# Patient Record
Sex: MALE | Race: WHITE
[De-identification: names, ages, dates, MRNs, and addresses within clinical notes are randomized per-mention and may not be internally consistent; named-entity substitution may affect disease eponyms.]

---

## 2020-10-16 ENCOUNTER — HOSPITAL ENCOUNTER (OUTPATIENT)
Dept: HOSPITAL 53 - M LABSMTC | Age: 55
End: 2020-10-16
Attending: ANESTHESIOLOGY
Payer: COMMERCIAL

## 2020-10-16 DIAGNOSIS — Z01.812: Primary | ICD-10-CM

## 2020-10-16 DIAGNOSIS — Z20.828: ICD-10-CM

## 2020-10-21 ENCOUNTER — HOSPITAL ENCOUNTER (OUTPATIENT)
Dept: HOSPITAL 53 - M OPP | Age: 55
Discharge: HOME | End: 2020-10-21
Attending: INTERNAL MEDICINE
Payer: COMMERCIAL

## 2020-10-21 VITALS — HEIGHT: 67 IN | BODY MASS INDEX: 28.09 KG/M2 | WEIGHT: 179 LBS

## 2020-10-21 VITALS — SYSTOLIC BLOOD PRESSURE: 126 MMHG | DIASTOLIC BLOOD PRESSURE: 87 MMHG

## 2020-10-21 DIAGNOSIS — K63.5: ICD-10-CM

## 2020-10-21 DIAGNOSIS — R19.8: ICD-10-CM

## 2020-10-21 DIAGNOSIS — Z15.09: Primary | ICD-10-CM

## 2020-10-21 DIAGNOSIS — K64.1: ICD-10-CM

## 2020-10-21 NOTE — ROOR
________________________________________________________________________________

Patient Name: Colin Garcia             Procedure Date: 10/21/2020 11:39 AM

MRN: [id number]                          Account Number: [id number]

YOB: 1965               Age: 55

Room: [facility]                            Gender: Male

Note Status: Finalized                 

________________________________________________________________________________

 

Procedure:           Total Colonoscopy to Cecum + Cold Snare Polypectomy + 

                     Hemoclip

Indications:         Positive Cologuard test

Providers:           Oliver Kim MD

Referring MD:        YAZMIN CHANG MD

Requesting Provider: 

Medicines:           Monitored Anesthesia Care

Complications:       No immediate complications.

________________________________________________________________________________

Procedure:           Pre-Anesthesia Assessment:

                     - The heart rate, respiratory rate, oxygen saturations, 

                     blood pressure, adequacy of pulmonary ventilation, and 

                     response to care were monitored throughout the procedure.

                     The Colonoscope was introduced through the anus and 

                     advanced to the cecum, identified by appendiceal orifice 

                     and ileocecal valve. The colonoscopy was performed 

                     without difficulty. The patient tolerated the procedure 

                     well. The quality of the bowel preparation was excellent.

                                                                                

Findings:

     The perianal and digital rectal examinations were normal.

     Non-bleeding external internal hemorrhoids were found during 

     retroflexion. The hemorrhoids were small and Grade II (internal 

     hemorrhoids that prolapse but reduce spontaneously).

     A medium polyp was found at 40 cm proximal to the anus. The polyp was 

     sessile. The polyp was removed with a cold snare. Resection and retrieval 

     were complete. To prevent bleeding after the polypectomy, one hemostatic 

     clip was successfully placed (MR conditional). There was no bleeding at 

     the end of the procedure.

     The exam was otherwise normal throughout the examined colon.

                                                                                

Impression:          - Non-bleeding external internal hemorrhoids.

                     - One medium polyp at 40 cm proximal to the anus, removed 

                     with a cold snare. Resected and retrieved. Clip (MR 

                     conditional) was placed.

                     - The exam was otherwise normal to the cecum.

Recommendation:      - Patient has a contact number available for emergencies. 

                     The signs and symptoms of potential delayed complications 

                     were discussed with the patient. Return to normal 

                     activities tomorrow. Written discharge instructions were 

                     provided to the patient.

                     - Discharge patient to home.

                     - Continue present medications.

                     - Await pathology results.

                     - Telephone GI clinic for pathology results in 1 week.

                     - Repeat colonoscopy in 5 years for surveillance based on 

                     pathology results.

                     - Return to referring physician.

                     - The findings and recommendations were discussed with 

                     the patient.

                                                                                

 

Oliver Kim MD

____________________

Oliver Kim MD

10/21/2020 12:04:53 PM

Electronically signed by Oliver Kim MD

Number of Addenda: 0

 

Note Initiated On: 10/21/2020 11:39 AM

Estimated Blood Loss:

     Estimated blood loss: none.

## 2021-03-05 ENCOUNTER — HOSPITAL ENCOUNTER (OUTPATIENT)
Dept: HOSPITAL 53 - M WUC | Age: 56
End: 2021-03-05
Attending: PHYSICIAN ASSISTANT
Payer: COMMERCIAL

## 2021-03-05 DIAGNOSIS — M25.569: Primary | ICD-10-CM

## 2021-03-05 DIAGNOSIS — Z96.652: ICD-10-CM

## 2021-03-05 LAB
APTT BLD: 33.7 SECONDS (ref 24.2–38.5)
BASOPHILS # BLD AUTO: 0.1 10^3/UL (ref 0–0.2)
BASOPHILS NFR BLD AUTO: 0.7 % (ref 0–1)
EOSINOPHIL # BLD AUTO: 0.2 10^3/UL (ref 0–0.5)
EOSINOPHIL NFR BLD AUTO: 2 % (ref 0–3)
ERYTHROCYTE [SEDIMENTATION RATE] IN BLOOD BY WESTERGREN METHOD: 17 MM/HR (ref 0–20)
HCT VFR BLD AUTO: 45.9 % (ref 42–52)
HGB BLD-MCNC: 14.4 G/DL (ref 13.5–17.5)
INR PPP: 0.94
LYMPHOCYTES # BLD AUTO: 1.8 10^3/UL (ref 1.5–5)
LYMPHOCYTES NFR BLD AUTO: 15 % (ref 24–44)
MCH RBC QN AUTO: 27.6 PG (ref 27–33)
MCHC RBC AUTO-ENTMCNC: 31.4 G/DL (ref 32–36.5)
MCV RBC AUTO: 87.9 FL (ref 80–96)
MONOCYTES # BLD AUTO: 0.9 10^3/UL (ref 0–0.8)
MONOCYTES NFR BLD AUTO: 8 % (ref 2–8)
NEUTROPHILS # BLD AUTO: 8.6 10^3/UL (ref 1.5–8.5)
NEUTROPHILS NFR BLD AUTO: 73.6 % (ref 36–66)
PLATELET # BLD AUTO: 358 10^3/UL (ref 150–450)
PROTHROMBIN TIME: 12.8 SECONDS (ref 12.5–14.3)
RBC # BLD AUTO: 5.22 10^6/UL (ref 4.3–6.1)
WBC # BLD AUTO: 11.7 10^3/UL (ref 4–10)

## 2021-05-07 ENCOUNTER — HOSPITAL ENCOUNTER (OUTPATIENT)
Dept: HOSPITAL 53 - M WUC | Age: 56
End: 2021-05-07
Attending: PHYSICIAN ASSISTANT
Payer: COMMERCIAL

## 2021-05-07 DIAGNOSIS — Z96.652: Primary | ICD-10-CM

## 2021-05-07 LAB
ALBUMIN SERPL BCG-MCNC: 3.7 GM/DL (ref 3.2–5.2)
BASOPHILS # BLD AUTO: 0.1 10^3/UL (ref 0–0.2)
BASOPHILS NFR BLD AUTO: 0.7 % (ref 0–1)
CRP SERPL-MCNC: 1 MG/DL (ref 0–0.3)
EOSINOPHIL # BLD AUTO: 0.7 10^3/UL (ref 0–0.5)
EOSINOPHIL NFR BLD AUTO: 6.8 % (ref 0–3)
ERYTHROCYTE [SEDIMENTATION RATE] IN BLOOD BY WESTERGREN METHOD: 3 MM/HR (ref 0–20)
FERRITIN SERPL-MCNC: 35 NG/ML (ref 26–388)
HCT VFR BLD AUTO: 45.1 % (ref 42–52)
HGB BLD-MCNC: 14.4 G/DL (ref 13.5–17.5)
IRON SATN MFR SERPL: 16.8 % (ref 19.7–50)
IRON SERPL-MCNC: 52 UG/DL (ref 65–175)
LYMPHOCYTES # BLD AUTO: 2.6 10^3/UL (ref 1.5–5)
LYMPHOCYTES NFR BLD AUTO: 26.1 % (ref 24–44)
MCH RBC QN AUTO: 27.7 PG (ref 27–33)
MCHC RBC AUTO-ENTMCNC: 31.9 G/DL (ref 32–36.5)
MCV RBC AUTO: 86.7 FL (ref 80–96)
MONOCYTES # BLD AUTO: 0.8 10^3/UL (ref 0–0.8)
MONOCYTES NFR BLD AUTO: 7.7 % (ref 2–8)
NEUTROPHILS # BLD AUTO: 5.8 10^3/UL (ref 1.5–8.5)
NEUTROPHILS NFR BLD AUTO: 58.4 % (ref 36–66)
PLATELET # BLD AUTO: 302 10^3/UL (ref 150–450)
RBC # BLD AUTO: 5.2 10^6/UL (ref 4.3–6.1)
TIBC SERPL-MCNC: 309 UG/DL (ref 250–450)
WBC # BLD AUTO: 9.9 10^3/UL (ref 4–10)

## 2022-02-23 ENCOUNTER — HOSPITAL ENCOUNTER (OUTPATIENT)
Dept: HOSPITAL 53 - M RAD | Age: 57
End: 2022-02-23
Attending: INTERNAL MEDICINE

## 2022-02-23 DIAGNOSIS — M54.50: Primary | ICD-10-CM

## 2022-10-09 ENCOUNTER — HOSPITAL ENCOUNTER (OUTPATIENT)
Dept: HOSPITAL 53 - M LABSMTC | Age: 57
End: 2022-10-09
Attending: ANESTHESIOLOGY
Payer: COMMERCIAL

## 2022-10-09 DIAGNOSIS — Z20.822: ICD-10-CM

## 2022-10-09 DIAGNOSIS — Z01.812: Primary | ICD-10-CM

## 2022-10-12 ENCOUNTER — HOSPITAL ENCOUNTER (OUTPATIENT)
Dept: HOSPITAL 53 - M SDC | Age: 57
Discharge: HOME | End: 2022-10-12
Attending: ORTHOPAEDIC SURGERY
Payer: COMMERCIAL

## 2022-10-12 VITALS — DIASTOLIC BLOOD PRESSURE: 73 MMHG | SYSTOLIC BLOOD PRESSURE: 134 MMHG

## 2022-10-12 VITALS — HEIGHT: 67 IN | WEIGHT: 180.8 LBS | BODY MASS INDEX: 28.38 KG/M2

## 2022-10-12 DIAGNOSIS — Z79.899: ICD-10-CM

## 2022-10-12 DIAGNOSIS — R51.9: ICD-10-CM

## 2022-10-12 DIAGNOSIS — I10: ICD-10-CM

## 2022-10-12 DIAGNOSIS — F12.10: ICD-10-CM

## 2022-10-12 DIAGNOSIS — M65.311: Primary | ICD-10-CM

## 2023-01-19 ENCOUNTER — HOSPITAL ENCOUNTER (OUTPATIENT)
Dept: HOSPITAL 53 - M PLALAB | Age: 58
End: 2023-01-19
Attending: PHYSICIAN ASSISTANT
Payer: COMMERCIAL

## 2023-01-19 DIAGNOSIS — M25.559: Primary | ICD-10-CM

## 2023-01-19 DIAGNOSIS — M25.562: ICD-10-CM

## 2023-01-19 LAB
BASOPHILS # BLD AUTO: 0.1 10^3/UL (ref 0–0.2)
BASOPHILS NFR BLD AUTO: 1 % (ref 0–1)
EOSINOPHIL # BLD AUTO: 0.8 10^3/UL (ref 0–0.5)
EOSINOPHIL NFR BLD AUTO: 8.5 % (ref 0–3)
ERYTHROCYTE [SEDIMENTATION RATE] IN BLOOD BY WESTERGREN METHOD: 10 MM/HR (ref 0–20)
HCT VFR BLD AUTO: 49.6 % (ref 42–52)
HGB BLD-MCNC: 15.5 G/DL (ref 13.5–17.5)
LYMPHOCYTES # BLD AUTO: 2.1 10^3/UL (ref 1.5–5)
LYMPHOCYTES NFR BLD AUTO: 23.3 % (ref 24–44)
MCH RBC QN AUTO: 27 PG (ref 27–33)
MCHC RBC AUTO-ENTMCNC: 31.3 G/DL (ref 32–36.5)
MCV RBC AUTO: 86.4 FL (ref 80–96)
MONOCYTES # BLD AUTO: 0.7 10^3/UL (ref 0–0.8)
MONOCYTES NFR BLD AUTO: 7.2 % (ref 2–8)
NEUTROPHILS # BLD AUTO: 5.4 10^3/UL (ref 1.5–8.5)
NEUTROPHILS NFR BLD AUTO: 59.5 % (ref 36–66)
PLATELET # BLD AUTO: 350 10^3/UL (ref 150–450)
RBC # BLD AUTO: 5.74 10^6/UL (ref 4.3–6.1)
WBC # BLD AUTO: 9.2 10^3/UL (ref 4–10)